# Patient Record
Sex: MALE | Race: WHITE | NOT HISPANIC OR LATINO | URBAN - METROPOLITAN AREA
[De-identification: names, ages, dates, MRNs, and addresses within clinical notes are randomized per-mention and may not be internally consistent; named-entity substitution may affect disease eponyms.]

---

## 2024-03-31 ENCOUNTER — HOSPITAL ENCOUNTER (EMERGENCY)
Facility: CLINIC | Age: 39
Discharge: HOME | End: 2024-03-31
Attending: FAMILY MEDICINE | Admitting: FAMILY MEDICINE
Payer: COMMERCIAL

## 2024-03-31 VITALS
HEIGHT: 73 IN | BODY MASS INDEX: 31.14 KG/M2 | HEART RATE: 77 BPM | WEIGHT: 235 LBS | DIASTOLIC BLOOD PRESSURE: 109 MMHG | SYSTOLIC BLOOD PRESSURE: 144 MMHG | TEMPERATURE: 97.8 F | OXYGEN SATURATION: 95 % | RESPIRATION RATE: 14 BRPM

## 2024-03-31 DIAGNOSIS — R00.2 PALPITATIONS: ICD-10-CM

## 2024-03-31 LAB
ANION GAP SERPL CALCULATED.3IONS-SCNC: 11 MMOL/L (ref 7–15)
BASOPHILS # BLD AUTO: 0.1 10E3/UL (ref 0–0.2)
BASOPHILS NFR BLD AUTO: 1 %
BUN SERPL-MCNC: 13.1 MG/DL (ref 6–20)
CALCIUM SERPL-MCNC: 9 MG/DL (ref 8.6–10)
CHLORIDE SERPL-SCNC: 100 MMOL/L (ref 98–107)
CREAT SERPL-MCNC: 0.73 MG/DL (ref 0.67–1.17)
DEPRECATED HCO3 PLAS-SCNC: 26 MMOL/L (ref 22–29)
EGFRCR SERPLBLD CKD-EPI 2021: >90 ML/MIN/1.73M2
EOSINOPHIL # BLD AUTO: 0.1 10E3/UL (ref 0–0.7)
EOSINOPHIL NFR BLD AUTO: 1 %
ERYTHROCYTE [DISTWIDTH] IN BLOOD BY AUTOMATED COUNT: 13.1 % (ref 10–15)
GLUCOSE SERPL-MCNC: 136 MG/DL (ref 70–99)
HCT VFR BLD AUTO: 41.2 % (ref 40–53)
HGB BLD-MCNC: 14 G/DL (ref 13.3–17.7)
HOLD SPECIMEN: NORMAL
HOLD SPECIMEN: NORMAL
IMM GRANULOCYTES # BLD: 0 10E3/UL
IMM GRANULOCYTES NFR BLD: 0 %
LYMPHOCYTES # BLD AUTO: 1.6 10E3/UL (ref 0.8–5.3)
LYMPHOCYTES NFR BLD AUTO: 24 %
MCH RBC QN AUTO: 31 PG (ref 26.5–33)
MCHC RBC AUTO-ENTMCNC: 34 G/DL (ref 31.5–36.5)
MCV RBC AUTO: 91 FL (ref 78–100)
MONOCYTES # BLD AUTO: 0.6 10E3/UL (ref 0–1.3)
MONOCYTES NFR BLD AUTO: 8 %
NEUTROPHILS # BLD AUTO: 4.4 10E3/UL (ref 1.6–8.3)
NEUTROPHILS NFR BLD AUTO: 66 %
NRBC # BLD AUTO: 0 10E3/UL
NRBC BLD AUTO-RTO: 0 /100
PLATELET # BLD AUTO: 217 10E3/UL (ref 150–450)
POTASSIUM SERPL-SCNC: 4.3 MMOL/L (ref 3.4–5.3)
RBC # BLD AUTO: 4.51 10E6/UL (ref 4.4–5.9)
SODIUM SERPL-SCNC: 137 MMOL/L (ref 135–145)
TROPONIN T SERPL HS-MCNC: <6 NG/L
WBC # BLD AUTO: 6.7 10E3/UL (ref 4–11)

## 2024-03-31 PROCEDURE — 93005 ELECTROCARDIOGRAM TRACING: CPT

## 2024-03-31 PROCEDURE — 80048 BASIC METABOLIC PNL TOTAL CA: CPT | Performed by: FAMILY MEDICINE

## 2024-03-31 PROCEDURE — 93010 ELECTROCARDIOGRAM REPORT: CPT | Performed by: FAMILY MEDICINE

## 2024-03-31 PROCEDURE — 36415 COLL VENOUS BLD VENIPUNCTURE: CPT | Performed by: FAMILY MEDICINE

## 2024-03-31 PROCEDURE — 85025 COMPLETE CBC W/AUTO DIFF WBC: CPT | Performed by: FAMILY MEDICINE

## 2024-03-31 PROCEDURE — 99284 EMERGENCY DEPT VISIT MOD MDM: CPT

## 2024-03-31 PROCEDURE — 99284 EMERGENCY DEPT VISIT MOD MDM: CPT | Mod: 25 | Performed by: FAMILY MEDICINE

## 2024-03-31 PROCEDURE — 84484 ASSAY OF TROPONIN QUANT: CPT | Performed by: FAMILY MEDICINE

## 2024-03-31 ASSESSMENT — ACTIVITIES OF DAILY LIVING (ADL): ADLS_ACUITY_SCORE: 35

## 2024-03-31 ASSESSMENT — COLUMBIA-SUICIDE SEVERITY RATING SCALE - C-SSRS
2. HAVE YOU ACTUALLY HAD ANY THOUGHTS OF KILLING YOURSELF IN THE PAST MONTH?: NO
6. HAVE YOU EVER DONE ANYTHING, STARTED TO DO ANYTHING, OR PREPARED TO DO ANYTHING TO END YOUR LIFE?: NO
1. IN THE PAST MONTH, HAVE YOU WISHED YOU WERE DEAD OR WISHED YOU COULD GO TO SLEEP AND NOT WAKE UP?: NO

## 2024-04-01 ENCOUNTER — ORDERS ONLY (AUTO-RELEASED) (OUTPATIENT)
Dept: EMERGENCY MEDICINE | Facility: CLINIC | Age: 39
End: 2024-04-01
Payer: COMMERCIAL

## 2024-04-01 DIAGNOSIS — R00.2 PALPITATIONS: ICD-10-CM

## 2024-04-01 NOTE — ED NOTES
Reports called to [facility] nurse Bernice Queen requested report to Monica MCNALLY.   Monica MCNALLY called, updated on testing and patient status.  Patient will be mailed out zio patch to mabel.  Monica MCNALLY notified the ED does not set up zio patch monitors and patient will need to schedule outpatient clinic visit when Zio patch arrives.  [facility] to send transport for patient.

## 2024-04-01 NOTE — ED TRIAGE NOTES
Patient reports he was sitting watching tv at [facility] when he felt like his heart was skipping beats. Patient states it lasted about a minute and then went away. Patient denies pain with this episode.     Triage Assessment (Adult)       Row Name 03/31/24 6897          Triage Assessment    Airway WDL WDL        Respiratory WDL    Respiratory WDL WDL        Skin Circulation/Temperature WDL    Skin Circulation/Temperature WDL WDL        Cardiac WDL    Cardiac WDL X;all  Patient reports feeling palpitations        Chest Pain Assessment    Chest Pain Intervention cardiac biomarkers drawn;12-lead ECG obtained        Peripheral/Neurovascular WDL    Peripheral Neurovascular WDL WDL        Cognitive/Neuro/Behavioral WDL    Cognitive/Neuro/Behavioral WDL WDL

## 2024-04-01 NOTE — ED PROVIDER NOTES
"  HPI   Patient is a 38-year-old male presenting by EMS transport from [place] for palpitations.  He is there for alcohol abuse.  He was admitted to the treatment just 2 days ago.  He is receiving Suboxone and phenobarbital for withdrawal.  He denies other prescription medication.  He denies significant past medical history other than his substance abuse.    The patient describes new palpitations that occurred this evening.  He had \"a few extra beats and then a couple of times when I felt like my heart was beating really fast and hard but that only lasted a few seconds and then it was gone.\"  He had some chest discomfort when this happened.  No chest symptoms currently.  No shortness of breath.  No lightheadedness or fainting.  He denies similar symptoms previously.  No recent trauma or injury.  No fever or obvious infectious symptoms.  No new over-the-counter medication or herbal medication.      Allergies:  No Known Allergies  Problem List:    There are no problems to display for this patient.     Past Medical History:    No past medical history on file.  Past Surgical History:    No past surgical history on file.  Family History:    No family history on file.  Social History:  Marital Status:        Medications:    No current outpatient medications on file.    Review of Systems   All other systems reviewed and are negative.      PE   BP: (!) 165/107  Pulse: 80  Temp: 97.8  F (36.6  C)  Resp: 14  Height: 185.4 cm (6' 1\")  Weight: 106.6 kg (235 lb)  SpO2: 98 %  Physical Exam  Vitals and nursing note reviewed.   Constitutional:       Comments: The patient is sedate with the medications that he recently received at [facility].   HENT:      Head: Atraumatic.      Right Ear: External ear normal.      Left Ear: External ear normal.      Nose: Nose normal.      Mouth/Throat:      Mouth: Mucous membranes are moist.      Pharynx: Oropharynx is clear.   Eyes:      General: No scleral icterus.     Extraocular Movements: " Extraocular movements intact.      Conjunctiva/sclera: Conjunctivae normal.      Pupils: Pupils are equal, round, and reactive to light.   Cardiovascular:      Rate and Rhythm: Normal rate.   Pulmonary:      Effort: Pulmonary effort is normal. No respiratory distress.   Musculoskeletal:         General: Normal range of motion.      Cervical back: Normal range of motion.   Skin:     General: Skin is warm and dry.   Neurological:      Mental Status: He is alert and oriented to person, place, and time.   Psychiatric:         Behavior: Behavior normal.         ED COURSE and [facility]   2239.  The patient presents with palpitations.  EKG documented below.  Lab values pending.    EKG  (2240)   Interpretation performed by me.  Rate: 77     Rhythm: ns     Axis: nl  Intervals: VA (12-2) 152, QRS (<12) 108, QTc (>5) 415  P wave: nl     QRS complex: nl   ST segment / T-wave: T wave inversion in leads III and V1.  J-point elevation in V2 and V3.  Conclusion: Nonspecific T wave changes.  J-point elevation as above.    Electronic medical chart reviewed, including medical problems, medications, medical allergies, social history.  Recent hospitalizations and surgical procedures reviewed.  Recent clinic visits and consultations reviewed.  Recent labs and test results reviewed.  Nursing notes reviewed.    The patient, their parent if applicable, and/or their medical decision maker(s) and I have reviewed all of the available historical information, applicable PMH, physical exam findings, and objective diagnostic data gathered during this ED visit.  We then discussed all work-up options and then together agreed upon the course taken during this visit.  The ultimate disposition and plan was a cooperative decision made between myself and the patient, their parent if applicable, and/or their legal decision maker(s).  The risks and benefits of all decisions made during this visit were discussed to the best of my abilities given the  circumstances, and all parties are understanding of the pertinent ramifications of these decisions.      LABS  Labs Ordered and Resulted from Time of ED Arrival to Time of ED Departure   BASIC METABOLIC PANEL - Abnormal       Result Value    Sodium 137      Potassium 4.3      Chloride 100      Carbon Dioxide (CO2) 26      Anion Gap 11      Urea Nitrogen 13.1      Creatinine 0.73      GFR Estimate >90      Calcium 9.0      Glucose 136 (*)    TROPONIN T, HIGH SENSITIVITY - Normal    Troponin T, High Sensitivity <6     CBC WITH PLATELETS AND DIFFERENTIAL    WBC Count 6.7      RBC Count 4.51      Hemoglobin 14.0      Hematocrit 41.2      MCV 91      MCH 31.0      MCHC 34.0      RDW 13.1      Platelet Count 217      % Neutrophils 66      % Lymphocytes 24      % Monocytes 8      % Eosinophils 1      % Basophils 1      % Immature Granulocytes 0      NRBCs per 100 WBC 0      Absolute Neutrophils 4.4      Absolute Lymphocytes 1.6      Absolute Monocytes 0.6      Absolute Eosinophils 0.1      Absolute Basophils 0.1      Absolute Immature Granulocytes 0.0      Absolute NRBCs 0.0         IMAGING  Images reviewed by me.  Radiology report also reviewed.  ZIO PATCH 3-7 DAYS (additional cost to patient)    (Results Pending)       Procedures    Medications - No data to display      IMPRESSION       ICD-10-CM    1. Palpitations  R00.2 ZIO PATCH 3-7 DAYS (additional cost to patient)     ZIO PATCH 3-7 DAYS APPLICATION     CANCELED: ZIO PATCH MAIL OUT               Medication List      There are no discharge medications for this visit.                             Rhys Soares MD  03/31/24 8995

## 2024-04-01 NOTE — DISCHARGE INSTRUCTIONS
RETURN TO THE EMERGENCY ROOM FOR THE FOLLOWING:    Severely worsened breathing, new chest pain, recurring palpitations that are severe and lasting longer than 2 hours in duration, or at anytime for any concern.    FOLLOW UP:    Zio patch order was placed at the time of your discharge.  Because you are currently staying at [facility] I cannot order any mail out test.  An inpatient application type test was ordered.    TREATMENT RECOMMENDATIONS:    There have been no new medications provided and there are no prescription medication changes recommended.     NURSE ADVICE LINE:  [phone] or [phone]